# Patient Record
Sex: MALE
[De-identification: names, ages, dates, MRNs, and addresses within clinical notes are randomized per-mention and may not be internally consistent; named-entity substitution may affect disease eponyms.]

---

## 2020-07-24 PROBLEM — Z00.00 ENCOUNTER FOR PREVENTIVE HEALTH EXAMINATION: Status: ACTIVE | Noted: 2020-07-24

## 2020-07-28 ENCOUNTER — APPOINTMENT (OUTPATIENT)
Dept: PULMONOLOGY | Facility: CLINIC | Age: 85
End: 2020-07-28
Payer: MEDICARE

## 2020-07-28 VITALS
DIASTOLIC BLOOD PRESSURE: 72 MMHG | BODY MASS INDEX: 23.22 KG/M2 | WEIGHT: 136 LBS | SYSTOLIC BLOOD PRESSURE: 180 MMHG | OXYGEN SATURATION: 96 % | TEMPERATURE: 97.5 F | HEIGHT: 64 IN | HEART RATE: 53 BPM

## 2020-07-28 DIAGNOSIS — I10 ESSENTIAL (PRIMARY) HYPERTENSION: ICD-10-CM

## 2020-07-28 DIAGNOSIS — Z87.09 PERSONAL HISTORY OF OTHER DISEASES OF THE RESPIRATORY SYSTEM: ICD-10-CM

## 2020-07-28 PROCEDURE — 99203 OFFICE O/P NEW LOW 30 MIN: CPT

## 2020-07-31 PROBLEM — I10 HTN (HYPERTENSION), BENIGN: Status: RESOLVED | Noted: 2020-07-31 | Resolved: 2020-07-31

## 2020-07-31 PROBLEM — Z87.09 HISTORY OF ASTHMA: Status: RESOLVED | Noted: 2020-07-31 | Resolved: 2020-07-31

## 2020-07-31 RX ORDER — KETOCONAZOLE 20.5 MG/ML
2 SHAMPOO, SUSPENSION TOPICAL
Qty: 120 | Refills: 0 | Status: DISCONTINUED | COMMUNITY
Start: 2020-04-15

## 2020-07-31 RX ORDER — FUROSEMIDE 40 MG/1
40 TABLET ORAL
Qty: 90 | Refills: 0 | Status: ACTIVE | COMMUNITY
Start: 2020-06-29

## 2020-07-31 RX ORDER — SILODOSIN 8 MG/1
8 CAPSULE ORAL
Qty: 90 | Refills: 0 | Status: ACTIVE | COMMUNITY
Start: 2020-07-20

## 2020-07-31 RX ORDER — HYDROCHLOROTHIAZIDE 12.5 MG/1
12.5 CAPSULE ORAL
Qty: 90 | Refills: 0 | Status: ACTIVE | COMMUNITY
Start: 2020-03-22

## 2020-07-31 RX ORDER — GALANTAMINE 8 MG/1
8 TABLET, FILM COATED ORAL
Qty: 30 | Refills: 0 | Status: DISCONTINUED | COMMUNITY
Start: 2020-02-09

## 2020-07-31 RX ORDER — MIRTAZAPINE 15 MG/1
15 TABLET, FILM COATED ORAL
Qty: 30 | Refills: 0 | Status: ACTIVE | COMMUNITY
Start: 2020-07-13

## 2020-07-31 RX ORDER — FLUTICASONE FUROATE AND VILANTEROL TRIFENATATE 200; 25 UG/1; UG/1
200-25 POWDER RESPIRATORY (INHALATION)
Qty: 180 | Refills: 0 | Status: ACTIVE | COMMUNITY
Start: 2020-02-05

## 2020-07-31 RX ORDER — OMEPRAZOLE 20 MG/1
20 CAPSULE, DELAYED RELEASE ORAL
Qty: 30 | Refills: 0 | Status: ACTIVE | COMMUNITY
Start: 2020-02-17

## 2020-07-31 RX ORDER — AZITHROMYCIN 250 MG/1
250 TABLET, FILM COATED ORAL
Qty: 6 | Refills: 0 | Status: DISCONTINUED | COMMUNITY
Start: 2020-04-03

## 2020-07-31 RX ORDER — AMLODIPINE BESYLATE 5 MG/1
5 TABLET ORAL
Qty: 90 | Refills: 0 | Status: ACTIVE | COMMUNITY
Start: 2020-03-23

## 2020-07-31 RX ORDER — DUTASTERIDE 0.5 MG/1
0.5 CAPSULE, LIQUID FILLED ORAL
Qty: 90 | Refills: 0 | Status: ACTIVE | COMMUNITY
Start: 2020-02-05

## 2020-07-31 RX ORDER — ALBUTEROL SULFATE 90 UG/1
108 (90 BASE) AEROSOL, METERED RESPIRATORY (INHALATION)
Qty: 54 | Refills: 0 | Status: ACTIVE | COMMUNITY
Start: 2020-03-23

## 2020-07-31 RX ORDER — TRIAMCINOLONE ACETONIDE 1 MG/G
0.1 CREAM TOPICAL
Qty: 80 | Refills: 0 | Status: DISCONTINUED | COMMUNITY
Start: 2020-02-19

## 2020-07-31 RX ORDER — ATORVASTATIN CALCIUM 20 MG/1
20 TABLET, FILM COATED ORAL
Qty: 90 | Refills: 0 | Status: ACTIVE | COMMUNITY
Start: 2019-03-25

## 2020-07-31 RX ORDER — RIVASTIGMINE TARTRATE 4.5 MG/1
4.5 CAPSULE ORAL
Qty: 10 | Refills: 0 | Status: ACTIVE | COMMUNITY
Start: 2020-07-27

## 2020-07-31 RX ORDER — AMOXICILLIN 875 MG/1
875 TABLET, FILM COATED ORAL
Qty: 6 | Refills: 0 | Status: DISCONTINUED | COMMUNITY
Start: 2020-02-28

## 2020-07-31 NOTE — HISTORY OF PRESENT ILLNESS
[TextBox_4] : 89 yo male presents for evaluation of increasing ARGUETA associated with dry cough and fatigue for three months.  The patient denies chest pain or hemoptysis. He has a hx of "asthma" since childhood,admitted many years ago, last on oral steroids a few years ago. He uses both breo and albuterol MDI PRN. He is on PPI but denies significant GERD related complaints. He was recently started on lasix and had a 2D echo yesterday. The patient's wife passed away two weeks ago.

## 2020-07-31 NOTE — REVIEW OF SYSTEMS
[Fatigue] : fatigue [Cough] : cough [SOB on Exertion] : sob on exertion [GERD] : gerd [Negative] : Endocrine [Sputum] : no sputum

## 2020-07-31 NOTE — DISCUSSION/SUMMARY
[FreeTextEntry1] : 89 yo male with recent increase in ARGUETA with hx of "asthma". Exam is relatively benign, but will treat with two week sample of trelegy in place of breo with continued use of PRN albuterol MDI. Treatment adjustment will depend on symptomatic needs. Continued optimization of cardiac status despite recent "normal" 2D echo. PFT in the future if no better. He is to follow up with his PMD as before.

## 2020-07-31 NOTE — PHYSICAL EXAM
[Normal Oropharynx] : normal oropharynx [No Acute Distress] : no acute distress [Normal Appearance] : normal appearance [Normal Rate/Rhythm] : normal rate/rhythm [No Neck Mass] : no neck mass [Normal S1, S2] : normal s1, s2 [No Murmurs] : no murmurs [No Resp Distress] : no resp distress [Benign] : benign [No Abnormalities] : no abnormalities [Clear to Auscultation Bilaterally] : clear to auscultation bilaterally [Normal Gait] : normal gait [No Clubbing] : no clubbing [No Cyanosis] : no cyanosis [FROM] : FROM [No Edema] : no edema [Oriented x3] : oriented x3 [Normal Color/ Pigmentation] : normal color/ pigmentation [No Focal Deficits] : no focal deficits [Normal Affect] : normal affect

## 2020-08-03 ENCOUNTER — LABORATORY RESULT (OUTPATIENT)
Age: 85
End: 2020-08-03

## 2020-08-03 ENCOUNTER — APPOINTMENT (OUTPATIENT)
Dept: DISASTER EMERGENCY | Facility: CLINIC | Age: 85
End: 2020-08-03

## 2020-08-06 ENCOUNTER — APPOINTMENT (OUTPATIENT)
Dept: PULMONOLOGY | Facility: CLINIC | Age: 85
End: 2020-08-06
Payer: MEDICARE

## 2020-08-06 VITALS
BODY MASS INDEX: 24.03 KG/M2 | TEMPERATURE: 97.9 F | WEIGHT: 140 LBS | OXYGEN SATURATION: 97 % | HEART RATE: 60 BPM | SYSTOLIC BLOOD PRESSURE: 184 MMHG | DIASTOLIC BLOOD PRESSURE: 66 MMHG

## 2020-08-06 PROCEDURE — 94729 DIFFUSING CAPACITY: CPT

## 2020-08-06 PROCEDURE — 99214 OFFICE O/P EST MOD 30 MIN: CPT | Mod: 25

## 2020-08-06 PROCEDURE — 94727 GAS DIL/WSHOT DETER LNG VOL: CPT

## 2020-08-06 PROCEDURE — 94060 EVALUATION OF WHEEZING: CPT

## 2020-08-07 NOTE — DISCUSSION/SUMMARY
[FreeTextEntry1] : 89 yo male with PRN ARGUETA. Given complaints and PFT will continue ICS/LABA. Nebulized bronchodilator will be started for PRN use. Treatment adjustment will depend on symptomatic needs. He is to follow up with his PMD as before. His aid was present. Will discuss with his daughter.

## 2020-08-07 NOTE — PHYSICAL EXAM
[No Acute Distress] : no acute distress [Normal Appearance] : normal appearance [Normal Oropharynx] : normal oropharynx [No Neck Mass] : no neck mass [Normal Rate/Rhythm] : normal rate/rhythm [No Murmurs] : no murmurs [Normal S1, S2] : normal s1, s2 [No Abnormalities] : no abnormalities [Clear to Auscultation Bilaterally] : clear to auscultation bilaterally [No Resp Distress] : no resp distress [Normal Gait] : normal gait [Benign] : benign [No Clubbing] : no clubbing [No Cyanosis] : no cyanosis [Normal Color/ Pigmentation] : normal color/ pigmentation [FROM] : FROM [No Edema] : no edema [No Focal Deficits] : no focal deficits [Oriented x3] : oriented x3 [Normal Affect] : normal affect

## 2020-08-07 NOTE — REVIEW OF SYSTEMS
[Fatigue] : fatigue [Cough] : cough [Sputum] : no sputum [SOB on Exertion] : sob on exertion [GERD] : gerd [Negative] : Psychiatric

## 2020-08-07 NOTE — HISTORY OF PRESENT ILLNESS
[TextBox_4] : 89 yo male with hx of PRN ARGUETA presents for follow up. The patient denies significant cough, without chest pain, fever or hemoptysis. He has not seen change in symptoms despite use of trelegy in place of breo.

## 2020-11-17 ENCOUNTER — APPOINTMENT (OUTPATIENT)
Dept: PULMONOLOGY | Facility: CLINIC | Age: 85
End: 2020-11-17
Payer: MEDICARE

## 2020-11-17 VITALS
SYSTOLIC BLOOD PRESSURE: 158 MMHG | DIASTOLIC BLOOD PRESSURE: 62 MMHG | WEIGHT: 146 LBS | OXYGEN SATURATION: 96 % | TEMPERATURE: 98.6 F | HEART RATE: 76 BPM | BODY MASS INDEX: 25.06 KG/M2

## 2020-11-17 PROCEDURE — 99214 OFFICE O/P EST MOD 30 MIN: CPT

## 2020-11-22 NOTE — HISTORY OF PRESENT ILLNESS
[Never] : never [TextBox_4] : 89 yo male with mild OAD on PFT presents complaining of ARGUETA associated with dry cough for three weeks. He denies fever,chest pain or hemoptysis. He uses breo daily with PRN albuterol neb and MDI. [TextBox_29] : Denies snoring, daytime somnolence, apneic episodes, AM headaches

## 2020-11-22 NOTE — REVIEW OF SYSTEMS
[Fatigue] : fatigue [Cough] : cough [Sputum] : no sputum [SOB on Exertion] : sob on exertion [GERD] : gerd [Negative] : Endocrine

## 2020-11-22 NOTE — DISCUSSION/SUMMARY
[FreeTextEntry1] : 89 yo male with OAD related complaints. He is to continue breo daily and increase use of nebulized albuterol as needed.Further treatment adjustment will depend on symptomatic needs. Cardiology follow up recommended. He has received the influenza vaccine. He is to follow up with his PMD as before.

## 2021-07-27 RX ORDER — FLUTICASONE PROPIONATE AND SALMETEROL 250; 50 UG/1; UG/1
250-50 POWDER RESPIRATORY (INHALATION)
Qty: 3 | Refills: 3 | Status: ACTIVE | COMMUNITY
Start: 2021-07-27 | End: 1900-01-01

## 2021-07-27 RX ORDER — FLUTICASONE PROPIONATE AND SALMETEROL 250; 50 UG/1; UG/1
250-50 POWDER RESPIRATORY (INHALATION)
Qty: 1 | Refills: 3 | Status: ACTIVE | COMMUNITY
Start: 2021-07-27 | End: 1900-01-01

## 2021-08-10 ENCOUNTER — APPOINTMENT (OUTPATIENT)
Dept: PULMONOLOGY | Facility: CLINIC | Age: 86
End: 2021-08-10
Payer: MEDICARE

## 2021-08-10 VITALS
BODY MASS INDEX: 25.06 KG/M2 | OXYGEN SATURATION: 94 % | SYSTOLIC BLOOD PRESSURE: 148 MMHG | HEART RATE: 89 BPM | DIASTOLIC BLOOD PRESSURE: 68 MMHG | TEMPERATURE: 96.8 F | WEIGHT: 146 LBS

## 2021-08-10 DIAGNOSIS — R04.2 HEMOPTYSIS: ICD-10-CM

## 2021-08-10 PROCEDURE — 99213 OFFICE O/P EST LOW 20 MIN: CPT

## 2021-08-22 PROBLEM — R04.2 HEMOPTYSIS: Status: ACTIVE | Noted: 2021-08-22

## 2021-08-22 NOTE — DISCUSSION/SUMMARY
[FreeTextEntry1] : 89 yo male with OAD related complaints for which he is to continue treatment as before. The etiology of "hemoptysis" is not clear but has stopped. He is to call if it recurs. He is to follow up with his PMD as before. His aid was present.

## 2021-08-22 NOTE — REVIEW OF SYSTEMS
[Fatigue] : fatigue [Cough] : cough [Hemoptysis] : hemoptysis [Sputum] : sputum [SOB on Exertion] : sob on exertion [GERD] : gerd [Negative] : Endocrine

## 2021-08-22 NOTE — HISTORY OF PRESENT ILLNESS
[Never] : never [TextBox_4] : 89 yo male with hx of OAD presents for follow up. The patient is "OK" on daily breo with PRN albuterol MDI. Three weeks ago he was evaluated in a walk in clinic for hemoptysis, with negative chest xray. He was seen by ENT with negative evaluation. He denies fever, chest pain, night sweats or weight loss. [TextBox_29] : Denies snoring, daytime somnolence, apneic episodes, AM headaches

## 2022-08-12 ENCOUNTER — APPOINTMENT (OUTPATIENT)
Dept: PULMONOLOGY | Facility: CLINIC | Age: 87
End: 2022-08-12

## 2022-08-12 VITALS
TEMPERATURE: 98.4 F | SYSTOLIC BLOOD PRESSURE: 140 MMHG | OXYGEN SATURATION: 94 % | DIASTOLIC BLOOD PRESSURE: 70 MMHG | HEART RATE: 80 BPM

## 2022-08-12 PROCEDURE — 99214 OFFICE O/P EST MOD 30 MIN: CPT

## 2022-08-12 RX ORDER — FLUTICASONE FUROATE AND VILANTEROL TRIFENATATE 100; 25 UG/1; UG/1
100-25 POWDER RESPIRATORY (INHALATION)
Qty: 3 | Refills: 5 | Status: ACTIVE | COMMUNITY
Start: 2021-08-10 | End: 1900-01-01

## 2022-08-12 RX ORDER — ALBUTEROL SULFATE 2.5 MG/3ML
(2.5 MG/3ML) SOLUTION RESPIRATORY (INHALATION)
Qty: 180 | Refills: 3 | Status: ACTIVE | COMMUNITY
Start: 2020-08-07 | End: 1900-01-01

## 2022-08-13 NOTE — DISCUSSION/SUMMARY
[FreeTextEntry1] : 89 yo male with hx of OAD at baseline on ICS/LABA. Treatment adjustment will depend on symptomatic needs. PFT will be performed in the future. He is to follow up with his PMD as before.

## 2022-08-13 NOTE — HISTORY OF PRESENT ILLNESS
[Never] : never [TextBox_4] : 89 yo male with hx of OAD presents for follow up.The patient is "OK" on daily breo with rare albuterol use. He denies cough, chest pain or hemoptysis. [TextBox_29] : Denies snoring, daytime somnolence, apneic episodes, AM headaches

## 2022-08-13 NOTE — REVIEW OF SYSTEMS
[GERD] : gerd [Negative] : Endocrine [Fatigue] : no fatigue [Cough] : no cough [Hemoptysis] : no hemoptysis [Sputum] : no sputum [SOB on Exertion] : no sob on exertion

## 2023-01-10 ENCOUNTER — APPOINTMENT (OUTPATIENT)
Dept: PULMONOLOGY | Facility: CLINIC | Age: 88
End: 2023-01-10
Payer: MEDICARE

## 2023-01-10 VITALS
DIASTOLIC BLOOD PRESSURE: 80 MMHG | HEART RATE: 81 BPM | TEMPERATURE: 99.6 F | SYSTOLIC BLOOD PRESSURE: 150 MMHG | OXYGEN SATURATION: 94 %

## 2023-01-10 DIAGNOSIS — R06.02 SHORTNESS OF BREATH: ICD-10-CM

## 2023-01-10 DIAGNOSIS — J44.9 CHRONIC OBSTRUCTIVE PULMONARY DISEASE, UNSPECIFIED: ICD-10-CM

## 2023-01-10 DIAGNOSIS — R05.9 COUGH, UNSPECIFIED: ICD-10-CM

## 2023-01-10 PROCEDURE — 99214 OFFICE O/P EST MOD 30 MIN: CPT

## 2023-01-11 RX ORDER — GUAIFENESIN 600 MG/1
600 TABLET, EXTENDED RELEASE ORAL
Qty: 60 | Refills: 0 | Status: ACTIVE | COMMUNITY
Start: 2023-01-11 | End: 1900-01-01

## 2023-02-07 ENCOUNTER — APPOINTMENT (OUTPATIENT)
Dept: PULMONOLOGY | Facility: CLINIC | Age: 88
End: 2023-02-07

## 2023-03-04 PROBLEM — R06.02 SOB (SHORTNESS OF BREATH): Status: ACTIVE | Noted: 2020-07-28

## 2023-03-04 PROBLEM — R05.9 COUGH: Status: ACTIVE | Noted: 2023-03-04

## 2023-03-04 PROBLEM — J44.9 OAD (OBSTRUCTIVE AIRWAY DISEASE): Status: ACTIVE | Noted: 2020-07-31

## 2023-03-04 NOTE — REVIEW OF SYSTEMS
[Fatigue] : no fatigue [Hemoptysis] : no hemoptysis [Cough] : cough [Sputum] : sputum [SOB on Exertion] : no sob on exertion [GERD] : gerd [Negative] : Endocrine

## 2023-03-04 NOTE — PHYSICAL EXAM
[No Acute Distress] : no acute distress [Normal Oropharynx] : normal oropharynx [Normal Appearance] : normal appearance [No Neck Mass] : no neck mass [Normal Rate/Rhythm] : normal rate/rhythm [Normal S1, S2] : normal s1, s2 [No Murmurs] : no murmurs [No Resp Distress] : no resp distress [Rhonchi] : rhonchi [No Abnormalities] : no abnormalities [Benign] : benign [No Clubbing] : no clubbing [No Cyanosis] : no cyanosis [No Edema] : no edema [FROM] : FROM [Normal Color/ Pigmentation] : normal color/ pigmentation [Oriented x3] : oriented x3 [No Focal Deficits] : no focal deficits [Normal Affect] : normal affect [TextBox_99] : in wheelchair

## 2023-03-04 NOTE — HISTORY OF PRESENT ILLNESS
[Never] : never [TextBox_4] : 89 yo male with hx of OAD presents for follow up. The patient was recently hospitalized for exacerbation, treated with antibiotics and systemic steroids. He was discharged with supplemental oxygen and nebulized bronchodilators with continued breo use. Presently he has productive cough PRN with difficulty expectorating without fever, chest pain or hemoptysis. [TextBox_29] : Denies snoring, daytime somnolence, apneic episodes, AM headaches

## 2023-03-04 NOTE — DISCUSSION/SUMMARY
[FreeTextEntry1] : 89 yo male with Hx of OAD post recent exacerbation. He is to continue ICS/LABA and nebulized bronchodilators with supplemental oxygen. Treatment adjustment will depend on symptomatic needs.He is to follow up with his PMD as before. His aid was present.

## 2023-03-10 ENCOUNTER — NON-APPOINTMENT (OUTPATIENT)
Age: 88
End: 2023-03-10

## 2023-09-08 RX ORDER — FLUTICASONE FUROATE AND VILANTEROL 100; 25 UG/1; UG/1
100-25 POWDER RESPIRATORY (INHALATION)
Qty: 1 | Refills: 3 | Status: ACTIVE | COMMUNITY
Start: 2023-09-08 | End: 1900-01-01